# Patient Record
Sex: FEMALE | Race: WHITE | URBAN - METROPOLITAN AREA
[De-identification: names, ages, dates, MRNs, and addresses within clinical notes are randomized per-mention and may not be internally consistent; named-entity substitution may affect disease eponyms.]

---

## 2017-04-24 ENCOUNTER — THERAPY VISIT (OUTPATIENT)
Dept: OCCUPATIONAL THERAPY | Facility: CLINIC | Age: 57
End: 2017-04-24
Payer: OTHER MISCELLANEOUS

## 2017-04-24 DIAGNOSIS — M79.645 THUMB PAIN, LEFT: ICD-10-CM

## 2017-04-24 DIAGNOSIS — M25.532 LEFT WRIST PAIN: Primary | ICD-10-CM

## 2017-04-24 PROCEDURE — 97110 THERAPEUTIC EXERCISES: CPT | Mod: GO | Performed by: OCCUPATIONAL THERAPIST

## 2017-04-24 PROCEDURE — 97140 MANUAL THERAPY 1/> REGIONS: CPT | Mod: GO | Performed by: OCCUPATIONAL THERAPIST

## 2017-04-24 PROCEDURE — 97165 OT EVAL LOW COMPLEX 30 MIN: CPT | Mod: GO | Performed by: OCCUPATIONAL THERAPIST

## 2017-04-24 NOTE — PROGRESS NOTES
Hand Therapy Initial Evaluation    Current Date:  4/24/2017    Diagnosis: L wrist pain  DOI:  Mid March '17  Procedure:  none    Precautions: NA    Subjective:  Emy Medrano is a 56 year old right hand dominant female.    Patient reports symptoms of pain, stiffness/loss of motion and edema of the left thumb and wrist which occurred due to work. Since onset symptoms are Unchanged  Special tests:  none.  Previous treatment: OTC orthosis.    General health as reported by patient is excellent.  Pertinent medical history includes:depression , smoking  Medical allergies:none.  Surgical history: none.  Medication history: anti-depressants, cymbolta.    Current occupation is [employer]   Currently working in normal job with restrictions, no checking  Job Tasks: lifting/carrying, pushing/pulling, repetitive tasks  Barriers include:none  Prior functional level:  no limitations    Additional Occupational Profile Information (patterns of daily living, interests, values and needs): NA    Functional Outcome Measure:  Upper Extremity Functional Index Score:  SCORE:   Column Totals: /80: 51   (A lower score indicates greater disability.)    Objective:  Pain Level Report  VAS(0-10) 4/24/2017   At Rest: 0/10   At Worst: 10/10     Report of Pain:  Location:  L wrist and thumb  Pain Quality:  Sharp and Shooting  Frequency: intermittent    Pain is worst:  daytime  Exacerbated by:  Movement, use  Relieved by:  rest  Progression:  Getting worse  ROM:  Pain Report:  - none    + mild    ++ moderate    +++ severe    4/24/2017   Thumb AROM  PROM left    MP 0/52    IP 0/38    Rabd 50    Pabd 50   Wrist Ext 62+    Flex 52    RD 12    UD 25    Sup 86    Pro 90     Pain level report on scale of 0-10/10  Resisted Testing 4/24/2017   APL -   EPB -   RD 5/10   UD 3/10   Wrist Ext -   Wrist Flex -     Strength:   (Measured in pounds)  Pain Report:  - none    + mild    ++ moderate    +++ severe     4/24/2017   Trials right  left   1  2  3 78  76  80 67  66  74   Average: 78 69     Lat Pinch  4/24/2017   Trials right left   1  2  3 17  16  16 17  17  17   Average: 16 17     3 Pt Pinch  4/24/2017   Trials right left   1  2  3 19  16  16 18  17  17   Average: 17 17     Pain Report:  - none    + mild    ++ moderate    +++ severe   Tenderness 4/24/2017   Radial Styloid +   SBRN -     Special Tests 4/24/2017   Genesteins L: 10/10   Radial Nerve Tinel's -     Assessment:  Patient presents with symptoms consistent with diagnosis of left wrist pain with conservative intervention.   Patient's limitations or Problem List includes:  Pain, Decreased ROM/motion, Weakness, Decreased  and pinch strength and tightness in musculature of the left wrist and thumb which interferes with the patient's ability to perform Self Care Tasks (dressing, eating, bathing, hygiene/toileting), Work Tasks, Sleep Patterns, Recreational Activities, Household Chores and Driving  as compared to previous level of function.    Rehab Potential:  Excellent - Return to full activity, no limitations    Patient will benefit from skilled Occupational Therapy to increase wrist and thumb ROM, flexibility,  strength and pinch strength and decrease pain to return to previous activity level and resume normal daily tasks and to reach their rehab potential.    Barriers to Learning:  No barrier    Communication Issues:  Patient appears to be able to clearly communicate and understand verbal and written communication and follow directions correctly.    Assessment of Occupational Performance:  5 or more Performance Deficits  Identified Performance Deficits: bathing/showering, toileting, dressing, feeding, driving and community mobility, sleep and work      Clinical Decision Making (Complexity): Low complexity    Treatment Explanation:  The following has been discussed with the patient:    RX ordered/plan of care  Anticipated outcomes  Possible risks and side effects    Treatment  Plan:    Frequency:   1 X week, once daily  Duration:  for 6 weeks    Modalities:    US, Fluidotherapy, Paraffin and Laser Light   Therapeutic Exercise:   AROM of wrist and thumb  PROM with stretch to wrist and thumb extensors   Manual Techniques:   Joint mobs  Friction massage over 1st dorsal compartment  MFR  Neuromuscular Reeducation   Nerve Gliding and Kinesiotaping  Orthotic Fabrication:  Forearm based thumb spica orthosis  Activity:   Avoid activities that exacerbate pain in the wrist or thumb.    Avoid  with twist, wide grasp.    Discharge Plan:    Achieve all LTG.  Independent in home treatment program.  Reach maximal therapeutic benefit.    Home Exercise Program:  Friction massage  Massage  AROM   Wrist   thumb    Next Visit:  A/AA/PROM  MFR  Jt. Mobs  K-tape

## 2017-05-01 ENCOUNTER — THERAPY VISIT (OUTPATIENT)
Dept: OCCUPATIONAL THERAPY | Facility: CLINIC | Age: 57
End: 2017-05-01
Payer: OTHER MISCELLANEOUS

## 2017-05-01 DIAGNOSIS — M25.532 LEFT WRIST PAIN: ICD-10-CM

## 2017-05-01 DIAGNOSIS — M79.645 THUMB PAIN, LEFT: ICD-10-CM

## 2017-05-01 PROCEDURE — 97140 MANUAL THERAPY 1/> REGIONS: CPT | Mod: GO | Performed by: OCCUPATIONAL THERAPIST

## 2017-05-01 PROCEDURE — 97110 THERAPEUTIC EXERCISES: CPT | Mod: GO | Performed by: OCCUPATIONAL THERAPIST

## 2017-05-01 PROCEDURE — 97112 NEUROMUSCULAR REEDUCATION: CPT | Mod: GO | Performed by: OCCUPATIONAL THERAPIST

## 2017-05-15 ENCOUNTER — THERAPY VISIT (OUTPATIENT)
Dept: OCCUPATIONAL THERAPY | Facility: CLINIC | Age: 57
End: 2017-05-15
Payer: OTHER MISCELLANEOUS

## 2017-05-15 DIAGNOSIS — M79.645 THUMB PAIN, LEFT: ICD-10-CM

## 2017-05-15 DIAGNOSIS — M25.532 LEFT WRIST PAIN: ICD-10-CM

## 2017-05-15 PROCEDURE — 97140 MANUAL THERAPY 1/> REGIONS: CPT | Mod: GO | Performed by: OCCUPATIONAL THERAPIST

## 2017-05-15 PROCEDURE — 97035 APP MDLTY 1+ULTRASOUND EA 15: CPT | Mod: GO | Performed by: OCCUPATIONAL THERAPIST

## 2017-05-15 PROCEDURE — 97110 THERAPEUTIC EXERCISES: CPT | Mod: GO | Performed by: OCCUPATIONAL THERAPIST

## 2017-05-22 ENCOUNTER — THERAPY VISIT (OUTPATIENT)
Dept: OCCUPATIONAL THERAPY | Facility: CLINIC | Age: 57
End: 2017-05-22
Payer: OTHER MISCELLANEOUS

## 2017-05-22 DIAGNOSIS — M79.645 THUMB PAIN, LEFT: ICD-10-CM

## 2017-05-22 DIAGNOSIS — M25.532 LEFT WRIST PAIN: ICD-10-CM

## 2017-05-22 PROCEDURE — 97112 NEUROMUSCULAR REEDUCATION: CPT | Mod: GO | Performed by: OCCUPATIONAL THERAPIST

## 2017-05-22 PROCEDURE — 97140 MANUAL THERAPY 1/> REGIONS: CPT | Mod: GO | Performed by: OCCUPATIONAL THERAPIST

## 2017-05-22 PROCEDURE — 97110 THERAPEUTIC EXERCISES: CPT | Mod: GO | Performed by: OCCUPATIONAL THERAPIST

## 2017-05-22 NOTE — PROGRESS NOTES
"Hand Therapy Progress Note    Current Date:  5/22/2017    Diagnosis: L wrist pain  DOI:  Mid March '17  Procedure:  none    Precautions: NA    Reporting period is 4/24/17 to 5/22/2017    Subjective:   Subjective changes noted by patient:  \"I feel like it's about the same.\"  Functional changes noted by patient:  No Change to Self Care Tasks (dressing, eating, bathing, hygiene/toileting), Work Tasks, Sleep Patterns, Recreational Activities, Household Chores and Driving   Patient has noted adverse reaction to:  None    Functional Outcome Measure:  Upper Extremity Functional Index Score:  SCORE:   Column Totals: /80: 67   (A lower score indicates greater disability.)    Objective:  Pain Level Report  VAS(0-10) 4/24/2017 5/22/17   At Rest: 0/10 0/10   At Worst: 10/10 10/10     Report of Pain:  Location:  L wrist and thumb  Pain Quality:  Sharp and Shooting  Frequency: intermittent    Pain is worst:  daytime  Exacerbated by:  Movement, use  Relieved by:  rest  Progression:  Staying the same  ROM:  Pain Report:  - none    + mild    ++ moderate    +++ severe    4/24/2017 5/22/17   Thumb AROM  PROM left     MP 0/52 0/52    IP 0/38 0/42    Rabd 50 36    Pabd 50 34   Wrist Ext 62+ 60+    Flex 52 56    RD 12 12+    UD 25 26+    Sup 86 88    Pro 90 90     Pain level report on scale of 0-10/10  Resisted Testing 4/24/2017 5/22/17   APL -    EPB -    RD 5/10 5/10   UD 3/10 -   Wrist Ext -    Wrist Flex -      Strength:   (Measured in pounds)  Pain Report:  - none    + mild    ++ moderate    +++ severe     4/24/2017 5/22/17   Trials right left    1  2  3 78  76  80 67  66  74 36++  41++  41   Average: 78 69 39     Lat Pinch  4/24/2017 5/22/17   Trials right left    1  2  3 17  16  16 17  17  17 15+  16+  18+   Average: 16 17 16     3 Pt Pinch  4/24/2017 5/22/17   Trials right left    1  2  3 19  16  16 18  17  17 10+  16  16   Average: 17 17 14     Pain Report:  - none    + mild    ++ moderate    +++ severe   Tenderness " 4/24/2017   Radial Styloid +   SBRN -     Special Tests 4/24/2017 5/22/17   Finkelsteins L: 10/10    Radial Nerve Tinel's -      Please refer to the daily flowsheet for treatment provided today.     Assessment:  Response to therapy has been improvement to:  ROM of Wrist:  All Planes  Thumb:  All Planes  Pain:  frequency is less  Response to therapy has been lack of progress in:  Strength:   and pinch    Overall Assessment:  Patient would benefit from continued therapy to achieve rehab potential  STG/LTG:  STGoals have been reviewed and progress or achievement has occurred;  see goal sheet for details and updates.    Plan:  Frequency/Duration:  Recommend continuing with the current treatment plan. 1 X week, once daily  for 6 weeks  Appropriateness of Rx I have re-evaluated this patient and find that the nature, scope, duration and intensity of the therapy is appropriate for the medical condition of the patient.  Recommendations for Continued Therapy  Additions to Treatment Plan -  Neuromuscular re-education:  Radial nerve glide    Home Exercise Program:  Friction massage  Massage   Extensors   thenars  AROM   Wrist   Thumb  Radial nerve glide    Next Visit:  A/MAIKEL/EDUARDO Gagnon. Desirae  K-tape

## 2017-05-30 ENCOUNTER — THERAPY VISIT (OUTPATIENT)
Dept: OCCUPATIONAL THERAPY | Facility: CLINIC | Age: 57
End: 2017-05-30
Payer: OTHER MISCELLANEOUS

## 2017-05-30 DIAGNOSIS — M25.532 LEFT WRIST PAIN: ICD-10-CM

## 2017-05-30 DIAGNOSIS — M79.645 THUMB PAIN, LEFT: ICD-10-CM

## 2017-05-30 PROCEDURE — 97026 INFRARED THERAPY: CPT | Mod: GO | Performed by: OCCUPATIONAL THERAPIST

## 2017-05-30 PROCEDURE — 97110 THERAPEUTIC EXERCISES: CPT | Mod: GO | Performed by: OCCUPATIONAL THERAPIST

## 2017-05-30 PROCEDURE — 97140 MANUAL THERAPY 1/> REGIONS: CPT | Mod: GO | Performed by: OCCUPATIONAL THERAPIST

## 2017-05-30 PROCEDURE — 97112 NEUROMUSCULAR REEDUCATION: CPT | Mod: GO | Performed by: OCCUPATIONAL THERAPIST

## 2017-05-30 NOTE — MR AVS SNAPSHOT
"              After Visit Summary   5/30/2017    Emy Medrano    MRN: [id number]           Patient Information     Date Of Birth          1960        Visit Information        Provider Department      5/30/2017 9:00 AM Wendy Parks [facility]        Today's Diagnoses     Left wrist pain        Thumb pain, left           Follow-ups after your visit        Your next 10 appointments already scheduled     Jun 06, 2017  9:30 AM CDT   LOLI Hand with Wendy Charly   [facility] ([facility])    [address] [place] [id number] [phone]              Who to contact     If you have questions or need follow up information about today's clinic visit or your schedule please contact [facility] directly at [phone].  Normal or non-critical lab and imaging results will be communicated to you by MyChart, letter or phone within 4 business days after the clinic has received the results. If you do not hear from us within 7 days, please contact the clinic through MyChart or phone. If you have a critical or abnormal lab result, we will notify you by phone as soon as possible.  Submit refill requests through [employer] or call your pharmacy and they will forward the refill request to us. Please allow 3 business days for your refill to be completed.          Additional Information About Your Visit        MyChart Information     [employer] lets you send messages to your doctor, view your test results, renew your prescriptions, schedule appointments and more. To sign up, go to [email or web address] . Click on \"Log in\" on the left side of the screen, which will take you to the Welcome page. Then click on \"Sign up Now\" on the right side of the page.     You will be asked to enter the access code listed below, as well as some personal information. Please follow the directions to create your username and password.     Your access code is: [email or web address]  Expires: 8/28/2017  " 9:43 AM     Your access code will  in 90 days. If you need help or a new code, please call your [place] clinic or [phone].        Care EveryWhere ID     This is your Care EveryWhere ID. This could be used by other organizations to access your [place] medical records  [id number]         Blood Pressure from Last 3 Encounters:   No data found for BP    Weight from Last 3 Encounters:   No data found for Wt              We Performed the Following     INFRARED THERAPY     MANUAL THER TECH,1+REGIONS,EA 15 MIN     NEUROMUSCULAR RE-EDUCATION     THERAPEUTIC EXERCISES        Primary Care Provider    None Specified       No primary provider on file.        Thank you!     Thank you for choosing [facility]  for your care. Our goal is always to provide you with excellent care. Hearing back from our patients is one way we can continue to improve our services. Please take a few minutes to complete the written survey that you may receive in the mail after your visit with us. Thank you!             Your Updated Medication List - Protect others around you: Learn how to safely use, store and throw away your medicines at www.disposemymeds.org.          This list is accurate as of: 17  9:43 AM.  Always use your most recent med list.                   Brand Name Dispense Instructions for use    busPIRone 5 MG tablet    BUSPAR    90 tablet    Take 1 tablet by mouth 3 times daily.       DULoxetine 60 MG EC capsule    CYMBALTA    30 capsule    Take 1 capsule by mouth every 48 hours.       ergocalciferol 27476 UNITS capsule    ERGOCALCIFEROL    8 capsule    Take 1 capsule by mouth daily.       gabapentin 300 & 600 MG 24 hr ED start pack    GRALISE    90 tablet    Take 300 tablets by mouth 3 times daily.       rOPINIRole 0.5 MG tablet    REQUIP    30 tablet    Take 1 tablet by mouth At Bedtime.

## 2017-05-30 NOTE — PROGRESS NOTES
SOAP note objective information for 5/30/2017.    Please refer to the daily flowsheet for treatment today, total treatment time and time spent performing 1:1 timed codes.       Pain Level Report  VAS(0-10) 4/24/2017 5/22/17 5/30/17   At Rest: 0/10 0/10 0   At Worst: 10/10 10/10 10     Report of Pain:  Location:  L wrist and thumb  Pain Quality:  Sharp and Shooting  Frequency: intermittent    Pain is worst:  daytime  Exacerbated by:  Movement, use  Relieved by:  rest  Progression:  Staying the same  ROM:  Pain Report:  - none    + mild    ++ moderate    +++ severe    4/24/2017 5/22/17      Thumb AROM  PROM left      MP 0/52 0/52     IP 0/38 0/42     Rabd 50 36     Pabd 50 34    Wrist Ext 62+ 60+     Flex 52 56     RD 12 12+     UD 25 26+     Sup 86 88     Pro 90 90      Pain level report on scale of 0-10/10  Resisted Testing 4/24/2017 5/22/17 5/30/17   APL -     EPB -     RD 5/10 5/10 5   UD 3/10 -    Wrist Ext -     Wrist Flex -       Strength:   (Measured in pounds)  Pain Report:  - none    + mild    ++ moderate    +++ severe     4/24/2017 5/22/17   Trials right left    1  2  3 78  76  80 67  66  74 36++  41++  41   Average: 78 69 39     Lat Pinch  4/24/2017 5/22/17   Trials right left    1  2  3 17  16  16 17  17  17 15+  16+  18+   Average: 16 17 16     3 Pt Pinch  4/24/2017 5/22/17   Trials right left    1  2  3 19  16  16 18  17  17 10+  16  16   Average: 17 17 14     Pain Report:  - none    + mild    ++ moderate    +++ severe   Tenderness 4/24/2017 5/30/17   Radial Styloid + +   SBRN -      Special Tests 4/24/2017 5/22/17 5/30/17   Finkelsteins L: 10/10  10   Radial Nerve Tinel's -       Please refer to the daily flowsheet for treatment provided today.       Home Exercise Program:  Friction massage  Massage   Extensors   thenars  AROM   Wrist   Thumb  Radial nerve glide    Next Visit:  A/AA/PROM  MFR  Jt. Mobs  K-tape

## 2017-06-06 ENCOUNTER — THERAPY VISIT (OUTPATIENT)
Dept: OCCUPATIONAL THERAPY | Facility: CLINIC | Age: 57
End: 2017-06-06
Payer: OTHER MISCELLANEOUS

## 2017-06-06 DIAGNOSIS — M79.645 THUMB PAIN, LEFT: ICD-10-CM

## 2017-06-06 DIAGNOSIS — M25.532 LEFT WRIST PAIN: ICD-10-CM

## 2017-06-06 PROCEDURE — 97140 MANUAL THERAPY 1/> REGIONS: CPT | Mod: GO | Performed by: OCCUPATIONAL THERAPIST

## 2017-06-06 PROCEDURE — 29125 APPL SHORT ARM SPLINT STATIC: CPT | Mod: GO | Performed by: OCCUPATIONAL THERAPIST

## 2017-06-06 PROCEDURE — 97110 THERAPEUTIC EXERCISES: CPT | Mod: GO | Performed by: OCCUPATIONAL THERAPIST

## 2017-06-06 PROCEDURE — 97026 INFRARED THERAPY: CPT | Mod: GO | Performed by: OCCUPATIONAL THERAPIST

## 2017-06-06 NOTE — PROGRESS NOTES
SOAP note objective information for 6/6/2017    Please refer to the daily flowsheet for treatment today, total treatment time and time spent performing 1:1 timed codes.       Pain Level Report  VAS(0-10) 4/24/2017 5/22/17 5/30/17 6/6/17   At Rest: 0/10 0/10 0 0   At Worst: 10/10 10/10 10 10     Report of Pain:  Location:  L wrist and thumb  Pain Quality:  Sharp and Shooting  Frequency: intermittent    Pain is worst:  daytime  Exacerbated by:  Movement, use  Relieved by:  rest  Progression:  Staying the same  ROM:  Pain Report:  - none    + mild    ++ moderate    +++ severe    4/24/2017 5/22/17      Thumb AROM  PROM left      MP 0/52 0/52     IP 0/38 0/42     Rabd 50 36     Pabd 50 34    Wrist Ext 62+ 60+     Flex 52 56     RD 12 12+     UD 25 26+     Sup 86 88     Pro 90 90      Pain level report on scale of 0-10/10  Resisted Testing 4/24/2017 5/22/17 5/30/17 6/6/17     APL -      EPB -      RD 5/10 5/10 5 5   UD 3/10 -     Wrist Ext -      Wrist Flex -        Strength:   (Measured in pounds)  Pain Report:  - none    + mild    ++ moderate    +++ severe     4/24/2017 5/22/17   Trials right left    1  2  3 78  76  80 67  66  74 36++  41++  41   Average: 78 69 39     Lat Pinch  4/24/2017 5/22/17   Trials right left    1  2  3 17  16  16 17  17  17 15+  16+  18+   Average: 16 17 16     3 Pt Pinch  4/24/2017 5/22/17   Trials right left    1  2  3 19  16  16 18  17  17 10+  16  16   Average: 17 17 14     Pain Report:  - none    + mild    ++ moderate    +++ severe   Tenderness 4/24/2017 5/30/17 6/6/17   Radial Styloid + + +   SBRN -       Special Tests 4/24/2017 5/22/17 5/30/17 6/6/17   Finkelsteins L: 10/10  10 9   Radial Nerve Tinel's -        Please refer to the daily flowsheet for treatment provided today.       Home Exercise Program:  Friction massage  Massage   Extensors   thenars  AROM   Wrist   Thumb  Radial nerve glide    Next Visit:  A/AA/PROM  MFR  Jt. Desirae  K-tape

## 2017-06-06 NOTE — MR AVS SNAPSHOT
"              After Visit Summary   6/6/2017    Emy Medrano    MRN: [id number]           Patient Information     Date Of Birth          1960        Visit Information        Provider Department      6/6/2017 9:30 AM Wendy Parks [facility]        Today's Diagnoses     Left wrist pain        Thumb pain, left           Follow-ups after your visit        Your next 10 appointments already scheduled     Jun 13, 2017  9:30 AM CDT   LOLI Hand with Wendy Charly   [facility] ([facility])    [address] [place] [id number] [phone]              Who to contact     If you have questions or need follow up information about today's clinic visit or your schedule please contact [facility] directly at [phone].  Normal or non-critical lab and imaging results will be communicated to you by MyChart, letter or phone within 4 business days after the clinic has received the results. If you do not hear from us within 7 days, please contact the clinic through MyChart or phone. If you have a critical or abnormal lab result, we will notify you by phone as soon as possible.  Submit refill requests through [employer] or call your pharmacy and they will forward the refill request to us. Please allow 3 business days for your refill to be completed.          Additional Information About Your Visit        MyChart Information     [employer] lets you send messages to your doctor, view your test results, renew your prescriptions, schedule appointments and more. To sign up, go to [email or web address] . Click on \"Log in\" on the left side of the screen, which will take you to the Welcome page. Then click on \"Sign up Now\" on the right side of the page.     You will be asked to enter the access code listed below, as well as some personal information. Please follow the directions to create your username and password.     Your access code is: [email or web address]  Expires: 8/28/2017  9:43 " AM     Your access code will  in 90 days. If you need help or a new code, please call your [place] clinic or [phone].        Care EveryWhere ID     This is your Care EveryWhere ID. This could be used by other organizations to access your [place] medical records  [id number]         Blood Pressure from Last 3 Encounters:   No data found for BP    Weight from Last 3 Encounters:   No data found for Wt              We Performed the Following     APPLY SHORT ARM SPLINT STATIC     INFRARED THERAPY     MANUAL THER TECH,1+REGIONS,EA 15 MIN     THERAPEUTIC EXERCISES        Primary Care Provider    None Specified       No primary provider on file.        Thank you!     Thank you for choosing [facility]  for your care. Our goal is always to provide you with excellent care. Hearing back from our patients is one way we can continue to improve our services. Please take a few minutes to complete the written survey that you may receive in the mail after your visit with us. Thank you!             Your Updated Medication List - Protect others around you: Learn how to safely use, store and throw away your medicines at www.disposemymeds.org.          This list is accurate as of: 17 11:51 AM.  Always use your most recent med list.                   Brand Name Dispense Instructions for use    busPIRone 5 MG tablet    BUSPAR    90 tablet    Take 1 tablet by mouth 3 times daily.       DULoxetine 60 MG EC capsule    CYMBALTA    30 capsule    Take 1 capsule by mouth every 48 hours.       ergocalciferol 98165 UNITS capsule    ERGOCALCIFEROL    8 capsule    Take 1 capsule by mouth daily.       gabapentin 300 & 600 MG 24 hr ED start pack    GRALISE    90 tablet    Take 300 tablets by mouth 3 times daily.       rOPINIRole 0.5 MG tablet    REQUIP    30 tablet    Take 1 tablet by mouth At Bedtime.

## 2017-06-07 ENCOUNTER — TELEPHONE (OUTPATIENT)
Dept: OCCUPATIONAL THERAPY | Facility: CLINIC | Age: 57
End: 2017-06-07

## 2017-06-07 DIAGNOSIS — M79.645 THUMB PAIN, LEFT: ICD-10-CM

## 2017-06-07 DIAGNOSIS — M25.532 LEFT WRIST PAIN: ICD-10-CM

## 2017-06-13 ENCOUNTER — THERAPY VISIT (OUTPATIENT)
Dept: OCCUPATIONAL THERAPY | Facility: CLINIC | Age: 57
End: 2017-06-13
Payer: OTHER MISCELLANEOUS

## 2017-06-13 DIAGNOSIS — M79.645 THUMB PAIN, LEFT: ICD-10-CM

## 2017-06-13 DIAGNOSIS — M25.532 LEFT WRIST PAIN: ICD-10-CM

## 2017-06-13 PROCEDURE — 97026 INFRARED THERAPY: CPT | Mod: GO | Performed by: OCCUPATIONAL THERAPIST

## 2017-06-13 PROCEDURE — 97112 NEUROMUSCULAR REEDUCATION: CPT | Mod: GO | Performed by: OCCUPATIONAL THERAPIST

## 2017-06-13 PROCEDURE — 97110 THERAPEUTIC EXERCISES: CPT | Mod: GO | Performed by: OCCUPATIONAL THERAPIST

## 2017-06-13 PROCEDURE — 97140 MANUAL THERAPY 1/> REGIONS: CPT | Mod: GO | Performed by: OCCUPATIONAL THERAPIST

## 2017-06-13 NOTE — PROGRESS NOTES
SOAP note objective information for 6/13/2017    Please refer to the daily flowsheet for treatment today, total treatment time and time spent performing 1:1 timed codes.       Pain Level Report  VAS(0-10) 4/24/2017 5/22/17 5/30/17 6/6/17 6/13/17   At Rest: 0/10 0/10 0 0 0   At Worst: 10/10 10/10 10 10 8 when tweak it     Report of Pain:  Location:  L wrist and thumb  Pain Quality:  Sharp and Shooting  Frequency: intermittent    Pain is worst:  daytime  Exacerbated by:  Movement, use  Relieved by:  rest  Progression:  Staying the same  ROM:  Pain Report:  - none    + mild    ++ moderate    +++ severe    4/24/2017 5/22/17 6/13/2017       Thumb AROM  PROM left left left    MP 0/52 0/52     IP 0/38 0/42     Rabd 50 36     Pabd 50 34    Wrist Ext 62+ 60+ 65-    Flex 52 56 62-    RD 12 12+ 12+    UD 25 26+ 25+    Sup 86 88     Pro 90 90      Pain level report on scale of 0-10/10  Resisted Testing 4/24/2017 5/22/17 5/30/17 6/6/17 6/13/17   APL -       EPB -       RD 5/10 5/10 5 5 5   UD 3/10 -      Wrist Ext -       Wrist Flex -         Strength:   (Measured in pounds)  Pain Report:  - none    + mild    ++ moderate    +++ severe     4/24/2017 5/22/17   Trials right left    1  2  3 78  76  80 67  66  74 36++  41++  41   Average: 78 69 39     Lat Pinch  4/24/2017 5/22/17   Trials right left    1  2  3 17  16  16 17  17  17 15+  16+  18+   Average: 16 17 16     3 Pt Pinch  4/24/2017 5/22/17   Trials right left    1  2  3 19  16  16 18  17  17 10+  16  16   Average: 17 17 14     Pain Report:  - none    + mild    ++ moderate    +++ severe   Tenderness 4/24/2017 5/30/17 6/6/17 6/13/17   Radial Styloid + + + +   SBRN -        Special Tests 4/24/2017 5/22/17 5/30/17 6/6/17 6/13/17   Finkelsteins L: 10/10  10 9    Radial Nerve Tinel's -         Please refer to the daily flowsheet for treatment provided today.       Home Exercise Program:  Friction massage  Massage   Extensors   thenars  AROM   Wrist   Thumb  Radial nerve  glide    Next Visit:  A/MAIKEL/PROM  BRITTANY Gagnon. Desirae  K-tape

## 2017-06-13 NOTE — MR AVS SNAPSHOT
"              After Visit Summary   6/13/2017    Emy Medrano    MRN: [id number]           Patient Information     Date Of Birth          1960        Visit Information        Provider Department      6/13/2017 9:30 AM Wendy Parks [facility]        Today's Diagnoses     Left wrist pain        Thumb pain, left           Follow-ups after your visit        Your next 10 appointments already scheduled     Jun 20, 2017  9:30 AM CDT   LOLI Hand with Wendy Charly   [facility] ([facility])    [address] [place] [address] [phone]              Who to contact     If you have questions or need follow up information about today's clinic visit or your schedule please contact [facility] directly at [phone].  Normal or non-critical lab and imaging results will be communicated to you by MyChart, letter or phone within 4 business days after the clinic has received the results. If you do not hear from us within 7 days, please contact the clinic through MyChart or phone. If you have a critical or abnormal lab result, we will notify you by phone as soon as possible.  Submit refill requests through "[employer] or call your pharmacy and they will forward the refill request to us. Please allow 3 business days for your refill to be completed.          Additional Information About Your Visit        MyChart Information     "[employer] lets you send messages to your doctor, view your test results, renew your prescriptions, schedule appointments and more. To sign up, go to [email or web address] . Click on \"Log in\" on the left side of the screen, which will take you to the Welcome page. Then click on \"Sign up Now\" on the right side of the page.     You will be asked to enter the access code listed below, as well as some personal information. Please follow the directions to create your username and password.     Your access code is: [email or web address]  Expires: 8/28/2017  " 9:43 AM     Your access code will  in 90 days. If you need help or a new code, please call your [place] clinic or [phone].        Care EveryWhere ID     This is your Care EveryWhere ID. This could be used by other organizations to access your [place] medical records  [id number]         Blood Pressure from Last 3 Encounters:   No data found for BP    Weight from Last 3 Encounters:   No data found for Wt              We Performed the Following     INFRARED THERAPY     MANUAL THER TECH,1+REGIONS,EA 15 MIN     NEUROMUSCULAR RE-EDUCATION     THERAPEUTIC EXERCISES        Primary Care Provider    None Specified       No primary provider on file.        Thank you!     Thank you for choosing [facility]  for your care. Our goal is always to provide you with excellent care. Hearing back from our patients is one way we can continue to improve our services. Please take a few minutes to complete the written survey that you may receive in the mail after your visit with us. Thank you!             Your Updated Medication List - Protect others around you: Learn how to safely use, store and throw away your medicines at www.disposemymeds.org.          This list is accurate as of: 17  2:21 PM.  Always use your most recent med list.                   Brand Name Dispense Instructions for use    busPIRone 5 MG tablet    BUSPAR    90 tablet    Take 1 tablet by mouth 3 times daily.       DULoxetine 60 MG EC capsule    CYMBALTA    30 capsule    Take 1 capsule by mouth every 48 hours.       ergocalciferol 76203 UNITS capsule    ERGOCALCIFEROL    8 capsule    Take 1 capsule by mouth daily.       gabapentin 300 & 600 MG 24 hr ED start pack    GRALISE    90 tablet    Take 300 tablets by mouth 3 times daily.       rOPINIRole 0.5 MG tablet    REQUIP    30 tablet    Take 1 tablet by mouth At Bedtime.

## 2017-06-20 ENCOUNTER — THERAPY VISIT (OUTPATIENT)
Dept: OCCUPATIONAL THERAPY | Facility: CLINIC | Age: 57
End: 2017-06-20
Payer: OTHER MISCELLANEOUS

## 2017-06-20 DIAGNOSIS — M25.532 LEFT WRIST PAIN: ICD-10-CM

## 2017-06-20 DIAGNOSIS — M79.645 THUMB PAIN, LEFT: ICD-10-CM

## 2017-06-20 PROCEDURE — 97112 NEUROMUSCULAR REEDUCATION: CPT | Mod: GO | Performed by: OCCUPATIONAL THERAPIST

## 2017-06-20 PROCEDURE — 97026 INFRARED THERAPY: CPT | Mod: GO | Performed by: OCCUPATIONAL THERAPIST

## 2017-06-20 PROCEDURE — 97110 THERAPEUTIC EXERCISES: CPT | Mod: GO | Performed by: OCCUPATIONAL THERAPIST

## 2017-06-20 PROCEDURE — 97140 MANUAL THERAPY 1/> REGIONS: CPT | Mod: GO | Performed by: OCCUPATIONAL THERAPIST

## 2017-06-20 NOTE — LETTER
[facility]  [address] [address]  [phone]    2017    Re: Emy Medrano   :   1960  MRN:  [id number]   REFERRING PHYSICIAN:   Leandro Ray    [facility]  Date of Initial Evaluation:  2017  Visits: 8   Rxs Used: 8  Reason for Referral:     Left wrist pain  Thumb pain, left  EVALUATION SUMMARY  Progress Note - Hand Therapy  Current Date:  2017  Diagnosis: L wrist pain  DOI:  Mid   Procedure:  None  Number of visits to date:  7  Reporting period is 2014 to 2017.  Subjectve:   Subjective changes as noted by patient: Splint helps.  Tweaking it less.  Functional changes noted by patient:  Improvement in Self Care Tasks  Patient has noted adverse reaction to:  None  Objective:  Pain Level Report  VAS(0-10) 2017   At Rest: 0/10 0/10 0 0 0 0   At Worst: 10/10 10/10 10 10 8 when tweak it 8 when tweak it   Report of Pain:  Location:  L wrist and thumb  Pain Quality:  Sharp and Shooting  Frequency: intermittent    Pain is worst:  daytime  Exacerbated by:  Movement, use  Relieved by:  rest  Progression:  Staying the same  ROM:  Pain Report:  - none    + mild    ++ moderate    +++ severe    2017     Thumb AROM  PROM left left left left    MP 0/52 0/52  52    IP 0/38 0/42  42    Rabd 50 36  40 min    Pabd 50 34  40 min   Wrist Ext 62+ 60+ 65- 65-    Flex 52 56 62- 65-    RD 12 12+ 12+ 7 min    UD 25 26+ 25+ 15+    Sup 86 88      Pro 90 90     Pain level report on scale of 0-10/10  Resisted Testing 2017   APL -        EPB -        RD 5/10 5/10 5 5 5 5   UD 3/10 -       Wrist Ext -        Wrist Flex -        Strength:   (Measured in pounds)  Pain Report:  - none    + mild    ++ moderate    +++ severe     2017   Trials right left left left   1  2  3 78  76  80 67  66  74  36++  41++  41    Average: 78 69 39 42     Lat Pinch  2017   Trials right left left left   1  2  3 17  16  16 17  17  17 15+  16+  18+    Average: 16 17 16 16     3 Pt Pinch  2017   Trials right left left left   1  2  3 19  16  16 18  17  17 10+  16  16    Average: 17 17 14 14   Pain Report:  - none    + mild    ++ moderate    +++ severe   Tenderness 2017   Radial Styloid + + + + +   SBRN -         Re: Emy Medrano   :   1960  Special Tests 2017   Finkelsteins L: 10/10  10 9 9 8   Radial Nerve Tinel's -        Please refer to the daily flowsheet for treatment provided today.   Assessment:  Response to therapy has been improvement to:  ROM of Wrist:  All Planes  Thumb:  All Planes  Flexibility:  tendon gliding is improved  Edema:  less  Pain:  frequency is less  Response to therapy has been lack of progress in:  Still has sharp and shooting pains.  Overall Assessment:  Patient's symptoms are slowly resolving.  STG/LTG:  STGoals have been reviewed and some progress or achievement has occurred;  see goal sheet for details and updates.  Plan:  Frequency/Duration:  Recommend continuing to see patient  1 X week.  W/C has not responded to authorization requests.  Appropriateness of Rx I have re-evaluated this patient and find that the nature, scope, duration and intensity of the therapy is appropriate for the medical condition of the patient.  Recommendations for Continued Therapy  Additions to Treatment Plan -  Massage with slight stretch of wrist (UD) and thumb flexion.  Home Exercise Program:  Friction massage  Massage   Extensors   thenars  AROM   Wrist   Thumb  Radial nerve glide  Next Visit:  A/AA/PROM  MFR  Jt. Desirae  K-tape    Thank you for your referral.    INQUIRIES  Therapist: Wendy Parks OTR/L, CHT  [address]  [address]  Phone: [phone]  Fax: [phone]

## 2017-06-20 NOTE — PROGRESS NOTES
Progress Note - Hand Therapy    Current Date:  6/20/2017    Diagnosis: L wrist pain  DOI:  Mid March '17  Procedure:  None    Number of visits to date:  7    Reporting period is 5/22/2014 to 6/20/2017.    Subjectve:   Subjective changes as noted by patient: Splint helps.  Tweaking it less.  Functional changes noted by patient:  Improvement in Self Care Tasks  Patient has noted adverse reaction to:  None        Objective:  Pain Level Report  VAS(0-10) 4/24/2017 5/22/17 5/30/17 6/6/17 6/13/17 6/20/2017   At Rest: 0/10 0/10 0 0 0 0   At Worst: 10/10 10/10 10 10 8 when tweak it 8 when tweak it     Report of Pain:  Location:  L wrist and thumb  Pain Quality:  Sharp and Shooting  Frequency: intermittent    Pain is worst:  daytime  Exacerbated by:  Movement, use  Relieved by:  rest  Progression:  Staying the same  ROM:  Pain Report:  - none    + mild    ++ moderate    +++ severe    4/24/2017 5/22/17 6/13/2017 6/20/2017     Thumb AROM  PROM left left left left    MP 0/52 0/52  52    IP 0/38 0/42  42    Rabd 50 36  40 min    Pabd 50 34  40 min   Wrist Ext 62+ 60+ 65- 65-    Flex 52 56 62- 65-    RD 12 12+ 12+ 7 min    UD 25 26+ 25+ 15+    Sup 86 88      Pro 90 90       Pain level report on scale of 0-10/10  Resisted Testing 4/24/2017 5/22/17 5/30/17 6/6/17 6/13/17 6/20/17   APL -        EPB -        RD 5/10 5/10 5 5 5 5   UD 3/10 -       Wrist Ext -        Wrist Flex -          Strength:   (Measured in pounds)  Pain Report:  - none    + mild    ++ moderate    +++ severe     4/24/2017 5/22/17 6/20/2017   Trials right left left left   1  2  3 78  76  80 67  66  74 36++  41++  41    Average: 78 69 39 42     Lat Pinch  4/24/2017 5/22/17 6/20/2017   Trials right left left left   1  2  3 17  16  16 17  17  17 15+  16+  18+    Average: 16 17 16 16     3 Pt Pinch  4/24/201724/2017 5/22/17 6/20/2017   Trials right left left left   1  2  3 19  16  16 18  17  17 10+  16  16    Average: 17 17 14 14     Pain Report:  - none    + mild     ++ moderate    +++ severe   Tenderness 4/24/2017 5/30/17 6/6/17 6/13/17 6/20/17   Radial Styloid + + + + +   SBRN -         Special Tests 4/24/2017 5/22/17 5/30/17 6/6/17 6/13/17 6/20/17   Finkelsteins L: 10/10  10 9 9 8   Radial Nerve Tinel's -          Please refer to the daily flowsheet for treatment provided today.       Assessment:  Response to therapy has been improvement to:  ROM of Wrist:  All Planes  Thumb:  All Planes  Flexibility:  tendon gliding is improved  Edema:  less  Pain:  frequency is less  Response to therapy has been lack of progress in:  Still has sharp and shooting pains.    Overall Assessment:  Patient's symptoms are slowly resolving.  STG/LTG:  STGoals have been reviewed and some progress or achievement has occurred;  see goal sheet for details and updates.    Plan:  Frequency/Duration:  Recommend continuing to see patient  1 X week.  W/C has not responded to authorization requests.  Appropriateness of Rx I have re-evaluated this patient and find that the nature, scope, duration and intensity of the therapy is appropriate for the medical condition of the patient.    Recommendations for Continued Therapy  Additions to Treatment Plan -  Massage with slight stretch of wrist (UD) and thumb flexion.    Home Exercise Program:  Friction massage  Massage   Extensors   thenars  AROM   Wrist   Thumb  Radial nerve glide    Next Visit:  MICHELE/MAIKEL/EDUARDO Gagnon. Georges  K-tape

## 2017-06-20 NOTE — MR AVS SNAPSHOT
"              After Visit Summary   6/20/2017    Emy Medrano    MRN: [id number]           Patient Information     Date Of Birth          1960        Visit Information        Provider Department      6/20/2017 9:30 AM Wendy Parks [facility]        Today's Diagnoses     Left wrist pain        Thumb pain, left           Follow-ups after your visit        Your next 10 appointments already scheduled     Jun 27, 2017  9:00 AM CDT   LOLI Hand with Wendy Charly   [facility] ([facility])    [address] [place] [address] [phone]              Who to contact     If you have questions or need follow up information about today's clinic visit or your schedule please contact [facility] directly at [phone].  Normal or non-critical lab and imaging results will be communicated to you by MyChart, letter or phone within 4 business days after the clinic has received the results. If you do not hear from us within 7 days, please contact the clinic through MyChart or phone. If you have a critical or abnormal lab result, we will notify you by phone as soon as possible.  Submit refill requests through [employer] or call your pharmacy and they will forward the refill request to us. Please allow 3 business days for your refill to be completed.          Additional Information About Your Visit        MyChart Information     [employer] lets you send messages to your doctor, view your test results, renew your prescriptions, schedule appointments and more. To sign up, go to [email or web address] . Click on \"Log in\" on the left side of the screen, which will take you to the Welcome page. Then click on \"Sign up Now\" on the right side of the page.     You will be asked to enter the access code listed below, as well as some personal information. Please follow the directions to create your username and password.     Your access code is: [email or web address]  Expires: 8/28/2017  " 9:43 AM     Your access code will  in 90 days. If you need help or a new code, please call your [place] clinic or [phone].        Care EveryWhere ID     This is your Care EveryWhere ID. This could be used by other organizations to access your [place] medical records  [id number]         Blood Pressure from Last 3 Encounters:   No data found for BP    Weight from Last 3 Encounters:   No data found for Wt              We Performed the Following     LOLI PROGRESS NOTES REPORT     INFRARED THERAPY     MANUAL THER TECH,1+REGIONS,EA 15 MIN     NEUROMUSCULAR RE-EDUCATION     THERAPEUTIC EXERCISES        Primary Care Provider    None Specified       No primary provider on file.        Thank you!     Thank you for choosing [facility]  for your care. Our goal is always to provide you with excellent care. Hearing back from our patients is one way we can continue to improve our services. Please take a few minutes to complete the written survey that you may receive in the mail after your visit with us. Thank you!             Your Updated Medication List - Protect others around you: Learn how to safely use, store and throw away your medicines at www.disposemymeds.org.          This list is accurate as of: 17 12:01 PM.  Always use your most recent med list.                   Brand Name Dispense Instructions for use    busPIRone 5 MG tablet    BUSPAR    90 tablet    Take 1 tablet by mouth 3 times daily.       DULoxetine 60 MG EC capsule    CYMBALTA    30 capsule    Take 1 capsule by mouth every 48 hours.       ergocalciferol 04294 UNITS capsule    ERGOCALCIFEROL    8 capsule    Take 1 capsule by mouth daily.       gabapentin 300 & 600 MG 24 hr ED start pack    GRALISE    90 tablet    Take 300 tablets by mouth 3 times daily.       rOPINIRole 0.5 MG tablet    REQUIP    30 tablet    Take 1 tablet by mouth At Bedtime.

## 2017-06-27 ENCOUNTER — THERAPY VISIT (OUTPATIENT)
Dept: OCCUPATIONAL THERAPY | Facility: CLINIC | Age: 57
End: 2017-06-27
Payer: OTHER MISCELLANEOUS

## 2017-06-27 DIAGNOSIS — M25.532 LEFT WRIST PAIN: ICD-10-CM

## 2017-06-27 DIAGNOSIS — M79.645 THUMB PAIN, LEFT: ICD-10-CM

## 2017-06-27 PROCEDURE — 29125 APPL SHORT ARM SPLINT STATIC: CPT | Mod: GO | Performed by: OCCUPATIONAL THERAPIST

## 2017-06-27 PROCEDURE — 97110 THERAPEUTIC EXERCISES: CPT | Mod: GO | Performed by: OCCUPATIONAL THERAPIST

## 2017-06-27 NOTE — PROGRESS NOTES
SOAP note objective information for 6/27/2017.    Please refer to the daily flowsheet for treatment today, total treatment time and time spent performing 1:1 timed codes.       Pain Level Report  VAS(0-10) 4/24/2017 5/22/17 5/30/17 6/6/17 6/13/17 6/20/2017 6/27/17   At Rest: 0/10 0/10 0 0 0 0 0   At Worst: 10/10 10/10 10 10 8 when tweak it 8 when tweak it Not using due to cortisone shot.     Report of Pain:  Location:  L wrist and thumb  Pain Quality:  Sharp and Shooting  Frequency: intermittent    Pain is worst:  daytime  Exacerbated by:  Movement, use  Relieved by:  rest  Progression:  Staying the same  ROM:  Pain Report:  - none    + mild    ++ moderate    +++ severe    4/24/2017 5/22/17 6/13/2017 6/20/2017        Thumb AROM  PROM left left left left left    MP 0/52 0/52  52     IP 0/38 0/42  42     Rabd 50 36  40 min     Pabd 50 34  40 min    Wrist Ext 62+ 60+ 65- 65-     Flex 52 56 62- 65-     RD 12 12+ 12+ 7 min     UD 25 26+ 25+ 15+     Sup 86 88       Pro 90 90        Pain level report on scale of 0-10/10  Resisted Testing 4/24/2017 5/22/17 5/30/17 6/6/17 6/13/17 6/20/17    APL -         EPB -         RD 5/10 5/10 5 5 5 5    UD 3/10 -        Wrist Ext -         Wrist Flex -           Strength:   (Measured in pounds)  Pain Report:  - none    + mild    ++ moderate    +++ severe     4/24/2017 5/22/17 6/20/2017    Trials right left left left left   1  2  3 78  76  80 67  66  74 36++  41++  41     Average: 78 69 39 42      Lat Pinch  4/24/2017 5/22/17 6/20/2017    Trials right left left left left   1  2  3 17  16  16 17  17  17 15+  16+  18+     Average: 16 17 16 16      3 Pt Pinch  4/24/2017 5/22/17 6/20/2017    Trials right left left left left   1  2  3 19  16  16 18  17  17 10+  16  16     Average: 17 17 14 14      Pain Report:  - none    + mild    ++ moderate    +++ severe   Tenderness 4/24/2017 5/30/17 6/6/17 6/13/17 6/20/17 6/27/17   Radial Styloid + + + + + + cortisone shot yesterday   SBRN -           Special Tests 4/24/2017 5/22/17 5/30/17 6/6/17 6/13/17 6/20/17 6/27/17   Finkelsteins L: 10/10  10 9 9 8 NT   Radial Nerve Tinel's -           Please refer to the daily flowsheet for treatment provided today.       Home Exercise Program:  Friction massage  Massage   Extensors   thenars  AROM   Wrist   Thumb  Radial nerve glide    Next Visit:  A/MAIKEL/EDUARDO Gagnon. Desirae  K-tape

## 2017-06-27 NOTE — MR AVS SNAPSHOT
"              After Visit Summary   6/27/2017    Emy Medrano    MRN: [id number]           Patient Information     Date Of Birth          1960        Visit Information        Provider Department      6/27/2017 9:00 AM Wendy Parks [facility]        Today's Diagnoses     Left wrist pain        Thumb pain, left           Follow-ups after your visit        Your next 10 appointments already scheduled     Jul 11, 2017 10:00 AM CDT   LOLI Hand with Wedny Charly   [facility] ([facility])    [address] [place] [address] [phone]              Who to contact     If you have questions or need follow up information about today's clinic visit or your schedule please contact [facility] directly at [phone].  Normal or non-critical lab and imaging results will be communicated to you by MyChart, letter or phone within 4 business days after the clinic has received the results. If you do not hear from us within 7 days, please contact the clinic through MyChart or phone. If you have a critical or abnormal lab result, we will notify you by phone as soon as possible.  Submit refill requests through [employer] or call your pharmacy and they will forward the refill request to us. Please allow 3 business days for your refill to be completed.          Additional Information About Your Visit        MyChart Information     [employer] lets you send messages to your doctor, view your test results, renew your prescriptions, schedule appointments and more. To sign up, go to [email or web address] . Click on \"Log in\" on the left side of the screen, which will take you to the Welcome page. Then click on \"Sign up Now\" on the right side of the page.     You will be asked to enter the access code listed below, as well as some personal information. Please follow the directions to create your username and password.     Your access code is: [email or web address]  Expires: 8/28/2017  " 9:43 AM     Your access code will  in 90 days. If you need help or a new code, please call your [place] clinic or [phone].        Care EveryWhere ID     This is your Care EveryWhere ID. This could be used by other organizations to access your [place] medical records  [id number]         Blood Pressure from Last 3 Encounters:   No data found for BP    Weight from Last 3 Encounters:   No data found for Wt              We Performed the Following     APPLY SHORT ARM SPLINT STATIC     THERAPEUTIC EXERCISES        Primary Care Provider    None Specified       No primary provider on file.        Equal Access to Services     [facility]: Hadii aad ku hadasho Soomaali, waaxda luqadaha, qaybta kaalmada adeegyada, vahe chin . So [facility] [phone].    ATENCIÓN: Si habla español, tiene a gann disposición servicios gratuitos de asistencia lingüística. Llame al [phone].    We comply with applicable federal civil rights laws and [place] laws. We do not discriminate on the basis of race, color, national origin, age, disability sex, sexual orientation or gender identity.            Thank you!     Thank you for choosing [facility]  for your care. Our goal is always to provide you with excellent care. Hearing back from our patients is one way we can continue to improve our services. Please take a few minutes to complete the written survey that you may receive in the mail after your visit with us. Thank you!             Your Updated Medication List - Protect others around you: Learn how to safely use, store and throw away your medicines at www.disposemymeds.org.          This list is accurate as of: 17  9:47 AM.  Always use your most recent med list.                   Brand Name Dispense Instructions for use Diagnosis    busPIRone 5 MG tablet    BUSPAR    90 tablet    Take 1 tablet by mouth 3 times daily.    Major depression       DULoxetine 60 MG EC capsule    CYMBALTA     30 capsule    Take 1 capsule by mouth every 48 hours.    Major depression       ergocalciferol 09716 UNITS capsule    ERGOCALCIFEROL    8 capsule    Take 1 capsule by mouth daily.    Vitamin D deficiency       gabapentin 300 & 600 MG 24 hr ED start pack    GRALISE    90 tablet    Take 300 tablets by mouth 3 times daily.    Major depression       rOPINIRole 0.5 MG tablet    REQUIP    30 tablet    Take 1 tablet by mouth At Bedtime.    Restless legs

## 2017-09-15 PROBLEM — M79.645 THUMB PAIN, LEFT: Status: RESOLVED | Noted: 2017-04-24 | Resolved: 2017-09-15

## 2017-09-15 PROBLEM — M25.532 LEFT WRIST PAIN: Status: RESOLVED | Noted: 2017-04-24 | Resolved: 2017-09-15
